# Patient Record
Sex: FEMALE | Race: WHITE | NOT HISPANIC OR LATINO | Employment: FULL TIME | URBAN - METROPOLITAN AREA
[De-identification: names, ages, dates, MRNs, and addresses within clinical notes are randomized per-mention and may not be internally consistent; named-entity substitution may affect disease eponyms.]

---

## 2023-10-17 ENCOUNTER — APPOINTMENT (OUTPATIENT)
Dept: PRIMARY CARE | Facility: CLINIC | Age: 28
End: 2023-10-17
Payer: COMMERCIAL

## 2023-10-31 ENCOUNTER — OFFICE VISIT (OUTPATIENT)
Dept: PRIMARY CARE | Facility: CLINIC | Age: 28
End: 2023-10-31
Payer: COMMERCIAL

## 2023-10-31 VITALS
HEIGHT: 63 IN | TEMPERATURE: 98 F | HEART RATE: 82 BPM | DIASTOLIC BLOOD PRESSURE: 76 MMHG | BODY MASS INDEX: 46.41 KG/M2 | RESPIRATION RATE: 17 BRPM | SYSTOLIC BLOOD PRESSURE: 122 MMHG | WEIGHT: 261.9 LBS | OXYGEN SATURATION: 99 %

## 2023-10-31 DIAGNOSIS — F32.A DEPRESSION, UNSPECIFIED DEPRESSION TYPE: ICD-10-CM

## 2023-10-31 DIAGNOSIS — F41.9 ANXIETY: Primary | ICD-10-CM

## 2023-10-31 PROCEDURE — 99385 PREV VISIT NEW AGE 18-39: CPT | Performed by: NURSE PRACTITIONER

## 2023-10-31 PROCEDURE — 90686 IIV4 VACC NO PRSV 0.5 ML IM: CPT | Performed by: NURSE PRACTITIONER

## 2023-10-31 PROCEDURE — 1036F TOBACCO NON-USER: CPT | Performed by: NURSE PRACTITIONER

## 2023-10-31 PROCEDURE — 90471 IMMUNIZATION ADMIN: CPT | Performed by: NURSE PRACTITIONER

## 2023-10-31 RX ORDER — BUPROPION HYDROCHLORIDE 100 MG/1
100 TABLET, EXTENDED RELEASE ORAL 2 TIMES DAILY
COMMUNITY
Start: 2023-08-29 | End: 2023-10-31 | Stop reason: SDUPTHER

## 2023-10-31 RX ORDER — CITALOPRAM 10 MG/1
10 TABLET ORAL DAILY
Qty: 90 TABLET | Refills: 1 | Status: SHIPPED | OUTPATIENT
Start: 2023-10-31 | End: 2024-03-05 | Stop reason: DRUGHIGH

## 2023-10-31 RX ORDER — BUPROPION HYDROCHLORIDE 100 MG/1
100 TABLET, EXTENDED RELEASE ORAL 2 TIMES DAILY
Qty: 180 TABLET | Refills: 1 | Status: SHIPPED | OUTPATIENT
Start: 2023-10-31 | End: 2024-10-30

## 2023-10-31 RX ORDER — CITALOPRAM 10 MG/1
10 TABLET ORAL DAILY
COMMUNITY
Start: 2023-09-02 | End: 2023-10-31 | Stop reason: SDUPTHER

## 2023-10-31 RX ORDER — ERGOCALCIFEROL 1.25 MG/1
1 CAPSULE ORAL
COMMUNITY
Start: 2022-08-12

## 2023-10-31 RX ORDER — UBIDECARENONE 75 MG
500 CAPSULE ORAL
COMMUNITY
Start: 2022-08-12

## 2023-10-31 ASSESSMENT — PATIENT HEALTH QUESTIONNAIRE - PHQ9
1. LITTLE INTEREST OR PLEASURE IN DOING THINGS: NOT AT ALL
SUM OF ALL RESPONSES TO PHQ9 QUESTIONS 1 AND 2: 0
2. FEELING DOWN, DEPRESSED OR HOPELESS: NOT AT ALL

## 2023-10-31 ASSESSMENT — PAIN SCALES - GENERAL: PAINLEVEL: 0-NO PAIN

## 2023-10-31 NOTE — PATIENT INSTRUCTIONS
Continue medications as prescribed.  Healthy diet and drink plenty of water.  Please schedule all necessary health screenings ophthalmology and dentist.    Follow-up in 6 months sooner if needed  Call office any new concerns.

## 2023-10-31 NOTE — PROGRESS NOTES
"Subjective   Patient ID: Eloisa Mccarthy is a 28 y.o. female who presents for Establish Care (New patient physical. ) and Med Refill.    Med Refill       Patient presents to clinic to establish care.  Previous patient at [facility] last seen by PCP less than a year ago.  Patient states she would like to transfer her care to .    Past medical history of anxiety depression arthritis scoliosis    She is without any specific complaints or concerns today needs medication refills.    Appetite normal bowel and bladder normal.      Review of Systems   All other systems reviewed and are negative.      Objective   /76 (BP Location: Right arm, Patient Position: Sitting, BP Cuff Size: Large adult)   Pulse 82   Temp 36.7 °C (98 °F) (Temporal)   Resp 17   Ht 1.6 m (5' 3\")   Wt 119 kg (261 lb 14.4 oz)   SpO2 99%   BMI 46.39 kg/m²     Physical Exam  Vitals reviewed.   Constitutional:       Appearance: Normal appearance.   HENT:      Right Ear: Tympanic membrane and external ear normal.      Left Ear: Tympanic membrane and external ear normal.      Mouth/Throat:      Mouth: Mucous membranes are moist.      Pharynx: Oropharynx is clear.   Eyes:      Pupils: Pupils are equal, round, and reactive to light.   Cardiovascular:      Rate and Rhythm: Normal rate and regular rhythm.   Pulmonary:      Effort: Pulmonary effort is normal.      Breath sounds: Normal breath sounds.   Abdominal:      General: Bowel sounds are normal.      Palpations: Abdomen is soft.   Musculoskeletal:         General: Normal range of motion.      Cervical back: Normal range of motion and neck supple.   Skin:     General: Skin is warm and dry.   Neurological:      Mental Status: She is alert and oriented to person, place, and time.   Psychiatric:         Mood and Affect: Mood normal.         Behavior: Behavior normal.         Thought Content: Thought content normal.         Judgment: Judgment normal.         Assessment/Plan   Problem List Items " Addressed This Visit    None  Visit Diagnoses       Anxiety    -  Primary    Relevant Medications    buPROPion SR (Wellbutrin SR) 100 mg 12 hr tablet    citalopram (CeleXA) 10 mg tablet    Other Relevant Orders    Referral to Psychiatry    Depression, unspecified depression type        Relevant Medications    buPROPion SR (Wellbutrin SR) 100 mg 12 hr tablet    citalopram (CeleXA) 10 mg tablet    Other Relevant Orders    Referral to Psychiatry

## 2024-03-05 ENCOUNTER — TELEMEDICINE (OUTPATIENT)
Dept: BEHAVIORAL HEALTH | Facility: CLINIC | Age: 29
End: 2024-03-05
Payer: COMMERCIAL

## 2024-03-05 DIAGNOSIS — F41.1 GAD (GENERALIZED ANXIETY DISORDER): ICD-10-CM

## 2024-03-05 PROCEDURE — 99204 OFFICE O/P NEW MOD 45 MIN: CPT | Performed by: PSYCHIATRY & NEUROLOGY

## 2024-03-05 PROCEDURE — 1036F TOBACCO NON-USER: CPT | Performed by: PSYCHIATRY & NEUROLOGY

## 2024-03-05 RX ORDER — CITALOPRAM 20 MG/1
20 TABLET, FILM COATED ORAL DAILY
Qty: 30 TABLET | Refills: 1 | Status: SHIPPED | OUTPATIENT
Start: 2024-03-05 | End: 2024-05-04
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
## 2024-04-02 ENCOUNTER — APPOINTMENT (OUTPATIENT)
Dept: BEHAVIORAL HEALTH | Facility: CLINIC | Age: 29
End: 2024-04-02
Payer: COMMERCIAL

## 2024-04-29 ENCOUNTER — APPOINTMENT (OUTPATIENT)
Dept: PRIMARY CARE | Facility: CLINIC | Age: 29
End: 2024-04-29
Payer: COMMERCIAL

## 2024-06-27 ENCOUNTER — OFFICE VISIT (OUTPATIENT)
Dept: PRIMARY CARE | Facility: CLINIC | Age: 29
End: 2024-06-27
Payer: COMMERCIAL

## 2024-06-27 VITALS
HEART RATE: 100 BPM | HEIGHT: 63 IN | DIASTOLIC BLOOD PRESSURE: 78 MMHG | SYSTOLIC BLOOD PRESSURE: 122 MMHG | TEMPERATURE: 97.7 F | WEIGHT: 278.6 LBS | OXYGEN SATURATION: 98 % | BODY MASS INDEX: 49.36 KG/M2

## 2024-06-27 DIAGNOSIS — R20.2 NUMBNESS AND TINGLING: Primary | ICD-10-CM

## 2024-06-27 DIAGNOSIS — R20.0 NUMBNESS AND TINGLING: Primary | ICD-10-CM

## 2024-06-27 PROCEDURE — 99213 OFFICE O/P EST LOW 20 MIN: CPT | Performed by: NURSE PRACTITIONER

## 2024-06-27 RX ORDER — IBUPROFEN 600 MG/1
600 TABLET ORAL 3 TIMES DAILY PRN
Qty: 30 TABLET | Refills: 0 | Status: SHIPPED | OUTPATIENT
Start: 2024-06-27 | End: 2024-07-07

## 2024-06-27 RX ORDER — LEVONORGESTREL 52 MG/1
INTRAUTERINE DEVICE INTRAUTERINE
COMMUNITY
Start: 2024-05-17

## 2024-06-27 ASSESSMENT — PAIN SCALES - GENERAL: PAINLEVEL: 0-NO PAIN

## 2024-06-27 NOTE — PATIENT INSTRUCTIONS
A prescription was sent to your pharmacy. Your pharmacist can answer any questions or concerns you may have or you may call the office.    Return for Annual with [facility]  Nothing to eat or drink for 10 hours. Black coffee, black tea or water is ok    Health Tips for People in their 20's     Develop good health habits now  Don't put it off. With good health habits, you can prevent or reduce the likelihood of health-impacting conditions later in life, such as obesity, high blood pressure, heart disease, or diabetes. Establishing good health habits now is easier than having to begin these practices later on, or to have to break bad habits.      Establish a relationship with a primary care provider   A PCP can help you on your health journey. When you see a provider on a regular basis, you're more likely to feel comfortable about talking about sensitive issues as well as being receptive to the advice your provider offers, because you develop a trusting relationship. And by getting to know you over time, your doctor may be better able to  on signs of health concerns.      Know your family health history   Knowing your family's health history can help you and your primary care provider better manage your health - and be aware of potential hereditary risks to be watching for.  Things like migraines or even family members with certain cancers and heart attack can increase your risk.       Get regular health screenings and Keep up with immunizations. This includes the HPV vaccine, for men and women.   For women: Monthly Self breast exams, See Gynecology for a Pap smear; HPV screening for the virus that causes genital warts, which can lead to cervical cancer   For men: Monthly Self testicular exams.   For both: sexually transmitted disease testing, if sexually active. Remember to use birth control to prevent and to protect. Talk with your health care provider about the screening schedule that's best for  you.    Have good for you people in your life  Its all about a few good friends over a lot of not so good friends. If you are close to your family stay that way, if not then develop new relationships that help you to grow and thrive. Often people make friends at work, Yazidism, community groups  Developing and maintaining a work-life balance that allows room for friendships and relationships can have a positive impact on your mental and emotional health.     Be a numbers person  Keep tabs on numbers that affect your health, like weight, blood pressure, the amount of calories you consume, and cholesterol. Your health care provider can help you with this.      Pay attention to the risk of a few extra pounds.  If you gain four or five pounds every year, it doesn't seem like a lot, but at the end of 10 years, you've added 40 or 50 pounds - and in 15 to 20 years, you have 75 to 100 extra pounds that you're carrying!  *Choose a life of healthful eating over trendy diets. The more effective approach: adopt a life-time practice of eating a balanced, nutritional diet that includes vegetables, fruits, lean meats, whole grains, low-fat dairy, nuts and legumes, and non-tropical vegetable oils. And limit sweets.   *Practice portion control. There's more to healthy eating than choosing nutritious food. There's also limiting how much you eat, even if you're eating incredibly healthy food *Remember, your metabolic rate slows as you age. That is, your body becomes less efficient in burning calories.     Stay active   If you're exercising on a regular basis, that is going to help with a lot of health problems that are related to lifestyle.  You don't have to be an athlete, start with a walking program. Even 10 minutes of good exercise is beneficial. Don't forget weight training. Any Weights 3 days a week maintains bone health and helps to burn calories    Get enough sleep  For most that means seven to nine hours a night.   Sleep  affects your ability to learn new information and to memorize and process information. Reaction time is adversely affected by too little sleep (a big safety risk similar to drinking alcohol). In the long run, sleep makes a big difference in how you function and succeed     Mood impacts your overall health  People who are struggling with depression, anxiety, and self-esteem issues really have a lot of difficulty with their health. When depressed, you may not be motivated and may not see the value of taking care of your health. Self Care, Exercise and friendships can help reduce your risk of mental and emotional health issues, and when you need it, your health care provider can help you get professional help.      Don't vape  Vaping is a big problem ,it's not just flavored water, nicotine comes from tobacco so you are in essence still smoking. Also, we don't know about the effects of what's in vaping cartridges, and sometimes they're modified with substances that can cause lung failure. Cigarettes are even worse with known cancer causing chemicals  2 ml of vape juice is equal to 1 pack of cigarettes    Think twice about marijuana  Even in states where marijuana is legal (medically or recreationally) it doesn't mean your employer is going to think it's OK.   Like alcohol, marijuana affects your reasoning, decision-making, and ability to operate equipment safely

## 2024-06-27 NOTE — PROGRESS NOTES
"Subjective   Patient ID: Eloisa Mccarthy is a 29 y.o. female who presents for new patient sick visit (Tingling in arms, middle of back, and hands; started one week ago) and Hearing Screening (Would like to get hearing tested ).    HPI   This is a pleasant 28 y/o female patient of Dr Blood at [facility] who presents with c/o  tingling to bilateral hands. First noted 1 week ago with associated pinching to the center of her back, between shoulder blades. Reports started a new job a few weeks ago and her chair pushes at the area on her back that pinches. Has been trying other chairs at work but hasn't found a more comfortable option. Denies trauma or injury, no loss of strength, intermittent tingle, does not know what makes better, worse with computer chair at work. Sits long days, does not stretch    Hearing loss-reports tv hard to hear, trouble hearing people speaking to her, family has noticed. Has family members with hearing loss at young age and she would like to be evaluated      Review of Systems  Review of Systems   Constitutional: Negative.    HENT: HPI     Respiratory: Negative.     Cardiovascular: Negative.    Gastrointestinal: Negative.    Genitourinary: Negative.    Musculoskeletal: HPI   Psychiatric/Behavioral: Negative.     All other systems reviewed and are negative.    Objective   /78 (BP Location: Left arm, Patient Position: Sitting)   Pulse 100   Temp 36.5 °C (97.7 °F)   Ht 1.6 m (5' 3\")   Wt 126 kg (278 lb 9.6 oz)   SpO2 98%   BMI 49.35 kg/m²     Physical Exam  Vitals reviewed.   Constitutional:       Appearance: Normal appearance.   HENT:      Head: Normocephalic and atraumatic.      Right Ear: Tympanic membrane and ear canal normal.      Left Ear: Tympanic membrane and ear canal normal.      Nose: Nose normal.      Mouth/Throat:      Pharynx: Oropharynx is clear.   Cardiovascular:      Rate and Rhythm: Normal rate and regular rhythm.   Pulmonary:      Effort: Pulmonary effort is normal.      " Breath sounds: Normal breath sounds.   Musculoskeletal:         General: No swelling, tenderness or deformity. Normal range of motion.      Right shoulder: Normal.      Left shoulder: Normal.      Right upper arm: Normal.      Left upper arm: Normal.      Right elbow: Normal.      Left elbow: Normal.      Right hand: Normal.      Left hand: Normal.      Cervical back: Normal.      Thoracic back: Normal.   Neurological:      Mental Status: She is alert.         Assessment/Plan   Problem List Items Addressed This Visit             ICD-10-CM    Numbness and tingling - Primary R20.0, R20.2     Find better chair, supportive  Daily stretches, Handouts provided and reviewed with patient, discussed           Relevant Medications    ibuprofen 600 mg tablet

## 2024-06-28 NOTE — ASSESSMENT & PLAN NOTE
Find better chair, supportive  Daily stretches, Handouts provided and reviewed with patient, discussed

## 2024-07-02 ENCOUNTER — APPOINTMENT (OUTPATIENT)
Dept: BEHAVIORAL HEALTH | Facility: CLINIC | Age: 29
End: 2024-07-02
Payer: COMMERCIAL

## 2024-07-02 DIAGNOSIS — F41.1 GAD (GENERALIZED ANXIETY DISORDER): ICD-10-CM

## 2024-07-02 DIAGNOSIS — F32.A DEPRESSION, UNSPECIFIED DEPRESSION TYPE: ICD-10-CM

## 2024-07-02 DIAGNOSIS — F41.9 ANXIETY: ICD-10-CM

## 2024-07-02 PROCEDURE — 99214 OFFICE O/P EST MOD 30 MIN: CPT | Performed by: PSYCHIATRY & NEUROLOGY
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
## 2024-07-03 ENCOUNTER — LAB (OUTPATIENT)
Dept: LAB | Facility: LAB | Age: 29
End: 2024-07-03
Payer: COMMERCIAL

## 2024-07-03 DIAGNOSIS — F41.1 GAD (GENERALIZED ANXIETY DISORDER): ICD-10-CM

## 2024-07-03 LAB
AMPHETAMINES UR QL SCN: NORMAL
BARBITURATES UR QL SCN: NORMAL
BENZODIAZ UR QL SCN: NORMAL
BZE UR QL SCN: NORMAL
CANNABINOIDS UR QL SCN: NORMAL
FENTANYL+NORFENTANYL UR QL SCN: NORMAL
METHADONE UR QL SCN: NORMAL
OPIATES UR QL SCN: NORMAL
OXYCODONE+OXYMORPHONE UR QL SCN: NORMAL
PCP UR QL SCN: NORMAL

## 2024-07-03 PROCEDURE — 80307 DRUG TEST PRSMV CHEM ANLYZR: CPT

## 2024-07-03 RX ORDER — CITALOPRAM 20 MG/1
20 TABLET, FILM COATED ORAL DAILY
Qty: 90 TABLET | Refills: 0 | Status: SHIPPED | OUTPATIENT
Start: 2024-07-03 | End: 2024-10-01

## 2024-07-03 RX ORDER — CLONAZEPAM 0.5 MG/1
0.5 TABLET ORAL DAILY PRN
Qty: 10 TABLET | Refills: 0 | Status: SHIPPED | OUTPATIENT
Start: 2024-07-03 | End: 2024-07-13

## 2024-07-03 RX ORDER — BUPROPION HYDROCHLORIDE 100 MG/1
100 TABLET, EXTENDED RELEASE ORAL 2 TIMES DAILY
Qty: 180 TABLET | Refills: 0 | Status: SHIPPED | OUTPATIENT
Start: 2024-07-03 | End: 2024-10-01

## 2024-07-12 DIAGNOSIS — Z00.00 ANNUAL PHYSICAL EXAM: Primary | ICD-10-CM

## 2024-07-16 ENCOUNTER — LAB (OUTPATIENT)
Dept: LAB | Facility: LAB | Age: 29
End: 2024-07-16
Payer: COMMERCIAL

## 2024-07-16 DIAGNOSIS — D50.8 IRON DEFICIENCY ANEMIA SECONDARY TO INADEQUATE DIETARY IRON INTAKE: ICD-10-CM

## 2024-07-16 DIAGNOSIS — E53.8 COBALAMIN DEFICIENCY: ICD-10-CM

## 2024-07-16 DIAGNOSIS — Z00.00 ANNUAL PHYSICAL EXAM: ICD-10-CM

## 2024-07-16 LAB
25(OH)D3 SERPL-MCNC: 33 NG/ML (ref 30–100)
ALBUMIN SERPL BCP-MCNC: 4.4 G/DL (ref 3.4–5)
ALP SERPL-CCNC: 53 U/L (ref 33–110)
ALT SERPL W P-5'-P-CCNC: 24 U/L (ref 7–45)
ANION GAP SERPL CALC-SCNC: 13 MMOL/L (ref 10–20)
AST SERPL W P-5'-P-CCNC: 16 U/L (ref 9–39)
BILIRUB SERPL-MCNC: 0.5 MG/DL (ref 0–1.2)
BUN SERPL-MCNC: 9 MG/DL (ref 6–23)
CALCIUM SERPL-MCNC: 9.2 MG/DL (ref 8.6–10.6)
CHLORIDE SERPL-SCNC: 105 MMOL/L (ref 98–107)
CHOLEST SERPL-MCNC: 141 MG/DL (ref 0–199)
CHOLESTEROL/HDL RATIO: 3.8
CO2 SERPL-SCNC: 27 MMOL/L (ref 21–32)
CREAT SERPL-MCNC: 0.75 MG/DL (ref 0.5–1.05)
EGFRCR SERPLBLD CKD-EPI 2021: >90 ML/MIN/1.73M*2
ERYTHROCYTE [DISTWIDTH] IN BLOOD BY AUTOMATED COUNT: 13.3 % (ref 11.5–14.5)
EST. AVERAGE GLUCOSE BLD GHB EST-MCNC: 103 MG/DL
GLUCOSE SERPL-MCNC: 89 MG/DL (ref 74–99)
HBA1C MFR BLD: 5.2 %
HCT VFR BLD AUTO: 40.3 % (ref 36–46)
HDLC SERPL-MCNC: 37.2 MG/DL
HGB BLD-MCNC: 13 G/DL (ref 12–16)
LDLC SERPL CALC-MCNC: 82 MG/DL
MCH RBC QN AUTO: 27.8 PG (ref 26–34)
MCHC RBC AUTO-ENTMCNC: 32.3 G/DL (ref 32–36)
MCV RBC AUTO: 86 FL (ref 80–100)
NON HDL CHOLESTEROL: 104 MG/DL (ref 0–149)
NRBC BLD-RTO: 0 /100 WBCS (ref 0–0)
PLATELET # BLD AUTO: 347 X10*3/UL (ref 150–450)
POTASSIUM SERPL-SCNC: 4.1 MMOL/L (ref 3.5–5.3)
PROT SERPL-MCNC: 7.4 G/DL (ref 6.4–8.2)
RBC # BLD AUTO: 4.68 X10*6/UL (ref 4–5.2)
SODIUM SERPL-SCNC: 141 MMOL/L (ref 136–145)
TRIGL SERPL-MCNC: 107 MG/DL (ref 0–149)
TSH SERPL-ACNC: 2.71 MIU/L (ref 0.44–3.98)
VLDL: 21 MG/DL (ref 0–40)
WBC # BLD AUTO: 10.4 X10*3/UL (ref 4.4–11.3)

## 2024-07-16 PROCEDURE — 82607 VITAMIN B-12: CPT

## 2024-07-16 PROCEDURE — 80053 COMPREHEN METABOLIC PANEL: CPT

## 2024-07-16 PROCEDURE — 83036 HEMOGLOBIN GLYCOSYLATED A1C: CPT

## 2024-07-16 PROCEDURE — 83540 ASSAY OF IRON: CPT

## 2024-07-16 PROCEDURE — 84443 ASSAY THYROID STIM HORMONE: CPT

## 2024-07-16 PROCEDURE — 82306 VITAMIN D 25 HYDROXY: CPT

## 2024-07-16 PROCEDURE — 36415 COLL VENOUS BLD VENIPUNCTURE: CPT

## 2024-07-16 PROCEDURE — 85027 COMPLETE CBC AUTOMATED: CPT

## 2024-07-16 PROCEDURE — 80061 LIPID PANEL: CPT

## 2024-07-18 ENCOUNTER — APPOINTMENT (OUTPATIENT)
Dept: PRIMARY CARE | Facility: CLINIC | Age: 29
End: 2024-07-18
Payer: COMMERCIAL

## 2024-07-18 VITALS
OXYGEN SATURATION: 97 % | TEMPERATURE: 97.5 F | BODY MASS INDEX: 49.15 KG/M2 | HEIGHT: 63 IN | SYSTOLIC BLOOD PRESSURE: 122 MMHG | WEIGHT: 277.4 LBS | HEART RATE: 83 BPM | DIASTOLIC BLOOD PRESSURE: 70 MMHG

## 2024-07-18 DIAGNOSIS — F41.1 GAD (GENERALIZED ANXIETY DISORDER): ICD-10-CM

## 2024-07-18 DIAGNOSIS — H90.6 MIXED CONDUCTIVE AND SENSORINEURAL HEARING LOSS OF BOTH EARS: ICD-10-CM

## 2024-07-18 DIAGNOSIS — E53.8 COBALAMIN DEFICIENCY: ICD-10-CM

## 2024-07-18 DIAGNOSIS — Z00.00 ANNUAL PHYSICAL EXAM: Primary | ICD-10-CM

## 2024-07-18 DIAGNOSIS — F33.1 MODERATE EPISODE OF RECURRENT MAJOR DEPRESSIVE DISORDER (MULTI): ICD-10-CM

## 2024-07-18 DIAGNOSIS — D50.8 IRON DEFICIENCY ANEMIA SECONDARY TO INADEQUATE DIETARY IRON INTAKE: ICD-10-CM

## 2024-07-18 PROBLEM — D50.9 IRON DEFICIENCY ANEMIA: Status: ACTIVE | Noted: 2019-02-08

## 2024-07-18 PROBLEM — G43.909 MIGRAINE WITHOUT STATUS MIGRAINOSUS, NOT INTRACTABLE: Status: ACTIVE | Noted: 2019-02-06

## 2024-07-18 PROBLEM — F33.9 EPISODE OF RECURRENT MAJOR DEPRESSIVE DISORDER (CMS-HCC): Status: ACTIVE | Noted: 2019-02-06

## 2024-07-18 LAB
ALBUMIN SERPL BCP-MCNC: 4.5 G/DL (ref 3.4–5)
ALP SERPL-CCNC: 52 U/L (ref 33–110)
ALT SERPL W P-5'-P-CCNC: 23 U/L (ref 7–45)
ANION GAP SERPL CALC-SCNC: 15 MMOL/L (ref 10–20)
AST SERPL W P-5'-P-CCNC: 18 U/L (ref 9–39)
BILIRUB SERPL-MCNC: 0.5 MG/DL (ref 0–1.2)
BUN SERPL-MCNC: 9 MG/DL (ref 6–23)
CALCIUM SERPL-MCNC: 9.2 MG/DL (ref 8.6–10.6)
CHLORIDE SERPL-SCNC: 105 MMOL/L (ref 98–107)
CO2 SERPL-SCNC: 25 MMOL/L (ref 21–32)
CREAT SERPL-MCNC: 0.65 MG/DL (ref 0.5–1.05)
EGFRCR SERPLBLD CKD-EPI 2021: >90 ML/MIN/1.73M*2
GLUCOSE SERPL-MCNC: 93 MG/DL (ref 74–99)
IRON SERPL-MCNC: 70 UG/DL (ref 35–150)
POTASSIUM SERPL-SCNC: 4.1 MMOL/L (ref 3.5–5.3)
PROT SERPL-MCNC: 7.2 G/DL (ref 6.4–8.2)
SODIUM SERPL-SCNC: 141 MMOL/L (ref 136–145)
TSH SERPL-ACNC: 2.85 MIU/L (ref 0.44–3.98)
VIT B12 SERPL-MCNC: 753 PG/ML (ref 211–911)

## 2024-07-18 PROCEDURE — 3008F BODY MASS INDEX DOCD: CPT | Performed by: NURSE PRACTITIONER

## 2024-07-18 PROCEDURE — 99395 PREV VISIT EST AGE 18-39: CPT | Performed by: NURSE PRACTITIONER

## 2024-07-18 RX ORDER — FERROUS SULFATE 325(65) MG
325 TABLET ORAL
COMMUNITY

## 2024-07-18 ASSESSMENT — PATIENT HEALTH QUESTIONNAIRE - PHQ9
SUM OF ALL RESPONSES TO PHQ9 QUESTIONS 1 AND 2: 0
2. FEELING DOWN, DEPRESSED OR HOPELESS: NOT AT ALL
1. LITTLE INTEREST OR PLEASURE IN DOING THINGS: NOT AT ALL

## 2024-07-18 ASSESSMENT — ENCOUNTER SYMPTOMS
CONSTITUTIONAL NEGATIVE: 1
PSYCHIATRIC NEGATIVE: 1
NEUROLOGICAL NEGATIVE: 1
HEMATOLOGIC/LYMPHATIC NEGATIVE: 1
ENDOCRINE NEGATIVE: 1
CARDIOVASCULAR NEGATIVE: 1
GASTROINTESTINAL NEGATIVE: 1
EYES NEGATIVE: 1
DEPRESSION: 0
ALLERGIC/IMMUNOLOGIC NEGATIVE: 1
MUSCULOSKELETAL NEGATIVE: 1
RESPIRATORY NEGATIVE: 1

## 2024-07-18 ASSESSMENT — PAIN SCALES - GENERAL: PAINLEVEL: 0-NO PAIN

## 2024-07-18 NOTE — PROGRESS NOTES
"Subjective   Patient ID: Eloisa Mccarthy is a 29 y.o. female who presents for Annual Exam (Pt is not fasting ).    HPI   Pleasant 28 y/o female with a PMH of Hearing loss, depression, anxiety, migraine without aura, cobalamin deficiency, and iron deficiency who presents for an annual exam.     Current concerns   Hearing loss, bilateral- Reports noted as a child. Lately has noticed more difficulty hearing high pitched sounds, uses subtitles on the tv, harder to hear patients at work. Denies tinnitus, trauma or injury, notes her sister is deaf.  Audiology referral placed.     BMI 49 - Interested in weight loss. Thought to be hormonal so she had IUD removal, noted to be displaced. Poor diet, trouble eating healthy 2/2 work schedule. Education provided on plate method, fruits and veggies, increase activity. Agrees to nutritionist referral.     At last visit had c/o Tingling in bilateral hands, arms, middle of back  thought to be due to chair at work. States resolved.    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  Mom biological - SLE, DM  Older Sister biological - deaf, SLE  Sister biological - Crohn's      Review of Systems   Constitutional: Negative.    HENT: Negative.     Eyes: Negative.    Respiratory: Negative.     Cardiovascular: Negative.    Gastrointestinal: Negative.    Endocrine: Negative.    Genitourinary: Negative.    Musculoskeletal: Negative.    Skin: Negative.    Allergic/Immunologic: Negative.    Neurological: Negative.    Hematological: Negative.    Psychiatric/Behavioral: Negative.     All other systems reviewed and are negative.      Objective   /70 (BP Location: Left arm, Patient Position: " "Sitting)   Pulse 83   Temp 36.4 °C (97.5 °F)   Ht 1.6 m (5' 3\")   Wt 126 kg (277 lb 6.4 oz)   LMP  (LMP Unknown)   SpO2 97%   BMI 49.14 kg/m²     Physical Exam  Vitals reviewed.   Constitutional:       Appearance: Normal appearance. She is obese.   HENT:      Head: Normocephalic and atraumatic.      Right Ear: Tympanic membrane, ear canal and external ear normal. Decreased hearing noted.      Left Ear: Tympanic membrane, ear canal and external ear normal. Decreased hearing noted.      Nose: Nose normal.      Mouth/Throat:      Mouth: Mucous membranes are moist.      Pharynx: Oropharynx is clear.   Eyes:      Extraocular Movements: Extraocular movements intact.      Conjunctiva/sclera: Conjunctivae normal.      Pupils: Pupils are equal, round, and reactive to light.   Cardiovascular:      Rate and Rhythm: Normal rate and regular rhythm.      Pulses: Normal pulses.      Heart sounds: Normal heart sounds.   Pulmonary:      Effort: Pulmonary effort is normal.      Breath sounds: Normal breath sounds.   Abdominal:      General: Bowel sounds are normal.      Palpations: Abdomen is soft.   Musculoskeletal:         General: Normal range of motion.      Cervical back: Normal range of motion and neck supple.   Skin:     General: Skin is warm.      Capillary Refill: Capillary refill takes less than 2 seconds.   Neurological:      General: No focal deficit present.      Mental Status: She is alert and oriented to person, place, and time. Mental status is at baseline.   Psychiatric:         Mood and Affect: Mood normal.         Behavior: Behavior normal.         Thought Content: Thought content normal.         Judgment: Judgment normal.         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          Annual physical exam - Primary Z00.00    Relevant Orders    Referral to Audiology    Comprehensive Metabolic Panel (Completed)    TSH with reflex to Free T4 if abnormal (Completed)    Referral to Nutrition Services    BMI 45.0-49.9, adult (Multi) Z68.42    Relevant Orders    Referral to Nutrition Services     I was present with the APRN student who participated in the documentation of this note. I have personally seen and re-examined the patient and performed the medical decision-making components (assessment and plan of care). I have reviewed the APRN student documentation and verified the findings in the note as written with additions or exceptions as stated in the body of this note  Josette Marti [facility]-BC

## 2024-07-18 NOTE — PATIENT INSTRUCTIONS
Labs will be released to My Chart or if you are not connected you will be notified of abnormals only    Health Tips for People in their 20's     Develop good health habits now  Don't put it off. With good health habits, you can prevent or reduce the likelihood of health-impacting conditions later in life, such as obesity, high blood pressure, heart disease, or diabetes. Establishing good health habits now is easier than having to begin these practices later on, or to have to break bad habits.      Establish a relationship with a primary care provider   A PCP can help you on your health journey. When you see a provider on a regular basis, you're more likely to feel comfortable about talking about sensitive issues as well as being receptive to the advice your provider offers, because you develop a trusting relationship. And by getting to know you over time, your doctor may be better able to  on signs of health concerns.      Know your family health history   Knowing your family's health history can help you and your primary care provider better manage your health - and be aware of potential hereditary risks to be watching for.  Things like migraines or even family members with certain cancers and heart attack can increase your risk.       Get regular health screenings and Keep up with immunizations. This includes the HPV vaccine, for men and women.   For women: Monthly Self breast exams, See Gynecology for a Pap smear; HPV screening for the virus that causes genital warts, which can lead to cervical cancer   For men: Monthly Self testicular exams.   For both: sexually transmitted disease testing, if sexually active. Remember to use birth control to prevent and to protect. Talk with your health care provider about the screening schedule that's best for you.    Have good for you people in your life  Its all about a few good friends over a lot of not so good friends. If you are close to your family stay that way,  if not then develop new relationships that help you to grow and thrive. Often people make friends at work, Jainism, community groups  Developing and maintaining a work-life balance that allows room for friendships and relationships can have a positive impact on your mental and emotional health.     Be a numbers person  Keep tabs on numbers that affect your health, like weight, blood pressure, the amount of calories you consume, and cholesterol. Your health care provider can help you with this.      Pay attention to the risk of a few extra pounds.  If you gain four or five pounds every year, it doesn't seem like a lot, but at the end of 10 years, you've added 40 or 50 pounds - and in 15 to 20 years, you have 75 to 100 extra pounds that you're carrying!  *Choose a life of healthful eating over trendy diets. The more effective approach: adopt a life-time practice of eating a balanced, nutritional diet that includes vegetables, fruits, lean meats, whole grains, low-fat dairy, nuts and legumes, and non-tropical vegetable oils. And limit sweets.   *Practice portion control. There's more to healthy eating than choosing nutritious food. There's also limiting how much you eat, even if you're eating incredibly healthy food *Remember, your metabolic rate slows as you age. That is, your body becomes less efficient in burning calories.     Stay active   If you're exercising on a regular basis, that is going to help with a lot of health problems that are related to lifestyle.  You don't have to be an athlete, start with a walking program. Even 10 minutes of good exercise is beneficial. Don't forget weight training. Any Weights 3 days a week maintains bone health and helps to burn calories    Get enough sleep  For most that means seven to nine hours a night.   Sleep affects your ability to learn new information and to memorize and process information. Reaction time is adversely affected by too little sleep (a big safety risk  similar to drinking alcohol). In the long run, sleep makes a big difference in how you function and succeed     Mood impacts your overall health  People who are struggling with depression, anxiety, and self-esteem issues really have a lot of difficulty with their health. When depressed, you may not be motivated and may not see the value of taking care of your health. Self Care, Exercise and friendships can help reduce your risk of mental and emotional health issues, and when you need it, your health care provider can help you get professional help.      Don't vape  Vaping is a big problem ,it's not just flavored water, nicotine comes from tobacco so you are in essence still smoking. Also, we don't know about the effects of what's in vaping cartridges, and sometimes they're modified with substances that can cause lung failure. Cigarettes are even worse with known cancer causing chemicals  2 ml of vape juice is equal to 1 pack of cigarettes    Think twice about marijuana  Even in states where marijuana is legal (medically or recreationally) it doesn't mean your employer is going to think it's OK.   Like alcohol, marijuana affects your reasoning, decision-making, and ability to operate equipment safely

## 2024-07-30 ENCOUNTER — CLINICAL SUPPORT (OUTPATIENT)
Dept: AUDIOLOGY | Facility: CLINIC | Age: 29
End: 2024-07-30
Payer: COMMERCIAL

## 2024-07-30 DIAGNOSIS — Z00.00 ANNUAL PHYSICAL EXAM: ICD-10-CM

## 2024-07-30 DIAGNOSIS — H90.3 SNHL (SENSORY-NEURAL HEARING LOSS), ASYMMETRICAL: Primary | ICD-10-CM

## 2024-07-30 DIAGNOSIS — H92.01 OTALGIA, RIGHT: ICD-10-CM

## 2024-07-30 PROCEDURE — 92557 COMPREHENSIVE HEARING TEST: CPT | Performed by: AUDIOLOGIST

## 2024-07-30 PROCEDURE — 92550 TYMPANOMETRY & REFLEX THRESH: CPT | Performed by: AUDIOLOGIST

## 2024-07-30 ASSESSMENT — PAIN - FUNCTIONAL ASSESSMENT: PAIN_FUNCTIONAL_ASSESSMENT: 0-10

## 2024-07-30 ASSESSMENT — PAIN SCALES - GENERAL: PAINLEVEL_OUTOF10: 2

## 2024-07-30 NOTE — PROGRESS NOTES
"AUDIOLOGY ADULT AUDIOMETRIC EVALUATION      Name:  Eloisa Mccarthy  :  1995  Age:  29 y.o.  Date of Evaluation:  2024    HISTORY  Reason for visit:  hearing loss  Ms. Mccarthy is seen 2024 at the request of  Josette Marti, *APRN-CNP for an evaluation of hearing.      Chief complaint:    - high frequency hearing loss; identified around elementary school  - has worsened since the last time it was tested (grade school)    Hearing loss:   hearing loss since childhood;  right ear seems worse than left  Tinnitus:   denies  Otitis Media: right ear enflamed at PCP visit 2024  Otologic surgical history:  denies   Dizziness/imbalance:  imbalance; dizziness/\"like you are on a boat\" with headache and neausea  Otalgia:  right ear pain; 2-3/10 today  Ear pressure/fullness:  denies  History of excessive noise exposure:  denies  Other:  sister is deaf    Hearing aid history: none          EVALUATION  Please find audiogram in \"Media\" tab (Document Type:  Audiology Report) or included at the bottom of this note.    RESULTS   Otoscopic Evaluation: minimal cerumen bilaterally      Immittance Measures (226 Hz probe tone):   Tympanometry is consistent with normal middle ear pressure and normal tympanic membrane mobility bilaterally.       Ipsilateral acoustic reflexes were present for 500-4000 Hz bilaterally.       Test technique:  standard behavioral technique via insert earphones.  Reliability is good.    Pure Tone Audiometry:  Hearing sensitivity is in the normal hearing to severe hearing loss range bilaterally.      Note asymmetry for 1500 Hz (left worse than right)      Speech Audiometry:        Right Ear:  Speech Reception Threshold (SRT) was obtained at 30 dBHL                 Speech discrimination score was 92% in quiet when words were presented at 80 dBHL      Left Ear:  Speech Reception Threshold (SRT) was obtained at 25 dBHL                 Speech discrimination score was 100% in quiet when words were " presented at 75 dBHL    IMPRESSIONS:  Patient is expected to have communication difficulty in adverse listening environments.    Patient is expected to benefit from devices that provide amplification (e.g., hearing aids) and improve the desired sound signal over that of background noise.       RECOMMENDATIONS  Consultation with an ear, nose, and throat (ENT) specialist regarding asymmetry and dizziness.    Hearing Aid Evaluation with an audiologist to discuss hearing technology (such as hearing aids) and services.  Patient was encouraged to check into insurance benefit for hearing aids and identify contracted providers.  If patient does not have (or does not wish to use) a hearing aid benefit, she may return to [facility] for hearing aid devices and services.   Reassess hearing in 1 year (or sooner if medically indicated or if there is a concern for a change in hearing).    Continue with medical follow-up as indicated.       PATIENT EDUCATION  Discussed results and recommendations with patient.  Questions were addressed and the patient was encouraged to contact our department should concerns arise.       ITA Hand, CCC-A  Licensed Audiologist

## 2024-08-08 ENCOUNTER — APPOINTMENT (OUTPATIENT)
Dept: PRIMARY CARE | Facility: CLINIC | Age: 29
End: 2024-08-08
Payer: COMMERCIAL

## 2024-09-16 ENCOUNTER — DOCUMENTATION (OUTPATIENT)
Dept: PRIMARY CARE | Facility: CLINIC | Age: 29
End: 2024-09-16
Payer: COMMERCIAL

## 2024-10-06 DIAGNOSIS — F41.1 GAD (GENERALIZED ANXIETY DISORDER): ICD-10-CM

## 2024-10-07 RX ORDER — CITALOPRAM 20 MG/1
20 TABLET, FILM COATED ORAL DAILY
Qty: 90 TABLET | Refills: 0 | Status: SHIPPED | OUTPATIENT
Start: 2024-10-07

## 2024-10-22 ENCOUNTER — APPOINTMENT (OUTPATIENT)
Dept: PRIMARY CARE | Facility: CLINIC | Age: 29
End: 2024-10-22
Payer: COMMERCIAL

## 2024-10-24 ENCOUNTER — APPOINTMENT (OUTPATIENT)
Dept: PRIMARY CARE | Facility: CLINIC | Age: 29
End: 2024-10-24
Payer: COMMERCIAL

## 2024-10-24 ENCOUNTER — OFFICE VISIT (OUTPATIENT)
Dept: PRIMARY CARE | Facility: CLINIC | Age: 29
End: 2024-10-24
Payer: COMMERCIAL

## 2024-10-24 VITALS
DIASTOLIC BLOOD PRESSURE: 66 MMHG | TEMPERATURE: 97.3 F | BODY MASS INDEX: 49.14 KG/M2 | OXYGEN SATURATION: 98 % | SYSTOLIC BLOOD PRESSURE: 112 MMHG | HEART RATE: 98 BPM | WEIGHT: 277.4 LBS

## 2024-10-24 DIAGNOSIS — H90.6 MIXED CONDUCTIVE AND SENSORINEURAL HEARING LOSS OF BOTH EARS: Primary | ICD-10-CM

## 2024-10-24 DIAGNOSIS — G43.829 MENSTRUAL MIGRAINE WITHOUT STATUS MIGRAINOSUS, NOT INTRACTABLE: ICD-10-CM

## 2024-10-24 DIAGNOSIS — L21.9 SEBORRHEIC DERMATITIS OF SCALP: ICD-10-CM

## 2024-10-24 DIAGNOSIS — F41.1 GAD (GENERALIZED ANXIETY DISORDER): ICD-10-CM

## 2024-10-24 PROCEDURE — 99214 OFFICE O/P EST MOD 30 MIN: CPT | Performed by: NURSE PRACTITIONER

## 2024-10-24 RX ORDER — CITALOPRAM 20 MG/1
20 TABLET, FILM COATED ORAL DAILY
Qty: 90 TABLET | Refills: 2 | Status: SHIPPED | OUTPATIENT
Start: 2024-10-24

## 2024-10-24 RX ORDER — SUMATRIPTAN 50 MG/1
50 TABLET, FILM COATED ORAL ONCE AS NEEDED
Qty: 9 TABLET | Refills: 5 | Status: SHIPPED | OUTPATIENT
Start: 2024-10-24 | End: 2025-10-24

## 2024-10-24 ASSESSMENT — ENCOUNTER SYMPTOMS: DEPRESSION: 0

## 2024-10-24 ASSESSMENT — PAIN SCALES - GENERAL: PAINLEVEL_OUTOF10: 0-NO PAIN

## 2024-10-24 NOTE — PATIENT INSTRUCTIONS
A prescription was sent to your pharmacy. Your pharmacist can answer any questions or concerns you may have or you may call the office.    Head and Shoulders    The best way to lose weight is to use up more calories than you consume each day. The first step is determining how many calories you need each day and how many calories you are actually consuming. [employer] and other free apps can help you to track your calories to see what you are eating and what high calorie foods you should be limiting. When decreasing high calorie foods you should also increase low calorie foods and water so that you stay full. Good sources include non starchy vegetables like lettuce and cucumber, high water fruits like watermelon. Avoid animal products or eat low fat versions of milk, cheeses, yogurt.  Daily exercise will help to burn calories as well which will speed up weight loss when following a low calorie diet. Walking, biking, swimming are all good exercises. Aim for 15-30 minutes each day. Adding weights 3 x a week help to build or maintain muscle and protect bone health. An added bonus is you continue to burn calories after you finish your weight workout!
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
## 2024-10-25 PROBLEM — L21.9 SEBORRHEIC DERMATITIS OF SCALP: Status: ACTIVE | Noted: 2024-10-25

## 2024-10-28 ENCOUNTER — OFFICE VISIT (OUTPATIENT)
Dept: PRIMARY CARE | Facility: CLINIC | Age: 29
End: 2024-10-28
Payer: COMMERCIAL

## 2024-10-28 VITALS
SYSTOLIC BLOOD PRESSURE: 140 MMHG | OXYGEN SATURATION: 100 % | WEIGHT: 280 LBS | DIASTOLIC BLOOD PRESSURE: 88 MMHG | BODY MASS INDEX: 49.6 KG/M2 | TEMPERATURE: 97.7 F | HEART RATE: 75 BPM

## 2024-10-28 DIAGNOSIS — G43.829 MENSTRUAL MIGRAINE WITHOUT STATUS MIGRAINOSUS, NOT INTRACTABLE: Primary | ICD-10-CM

## 2024-10-28 PROCEDURE — 99214 OFFICE O/P EST MOD 30 MIN: CPT | Performed by: NURSE PRACTITIONER

## 2024-10-28 PROCEDURE — 1036F TOBACCO NON-USER: CPT | Performed by: NURSE PRACTITIONER

## 2024-10-28 ASSESSMENT — PAIN SCALES - GENERAL: PAINLEVEL_OUTOF10: 6

## 2024-10-28 ASSESSMENT — ENCOUNTER SYMPTOMS: DEPRESSION: 0

## 2024-10-29 ENCOUNTER — PATIENT OUTREACH (OUTPATIENT)
Dept: CARE COORDINATION | Facility: CLINIC | Age: 29
End: 2024-10-29
Payer: COMMERCIAL
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
## 2024-11-18 PROBLEM — F32.A ANXIETY AND DEPRESSION: Status: ACTIVE | Noted: 2020-05-27

## 2024-11-18 PROBLEM — M54.32 SCIATICA, LEFT SIDE: Status: ACTIVE | Noted: 2017-10-19

## 2024-11-18 PROBLEM — N92.0 MENORRHAGIA: Status: ACTIVE | Noted: 2024-11-18

## 2024-11-18 PROBLEM — F41.9 ANXIETY AND DEPRESSION: Status: ACTIVE | Noted: 2020-05-27

## 2024-11-18 PROBLEM — T14.8XXA MUSCLE STRAIN: Status: ACTIVE | Noted: 2024-09-20

## 2024-11-18 PROBLEM — S39.012A STRAIN OF BACK: Status: ACTIVE | Noted: 2024-11-18

## 2024-11-18 PROBLEM — S93.402A LEFT ANKLE SPRAIN: Status: ACTIVE | Noted: 2024-09-20

## 2024-11-26 ENCOUNTER — APPOINTMENT (OUTPATIENT)
Dept: CARE COORDINATION | Facility: CLINIC | Age: 29
End: 2024-11-26
Payer: COMMERCIAL

## 2024-12-05 ENCOUNTER — APPOINTMENT (OUTPATIENT)
Dept: PRIMARY CARE | Facility: CLINIC | Age: 29
End: 2024-12-05
Payer: COMMERCIAL

## 2024-12-06 ENCOUNTER — PATIENT OUTREACH (OUTPATIENT)
Dept: CARE COORDINATION | Facility: CLINIC | Age: 29
End: 2024-12-06
Payer: COMMERCIAL

## 2024-12-11 ENCOUNTER — PATIENT OUTREACH (OUTPATIENT)
Dept: CARE COORDINATION | Facility: CLINIC | Age: 29
End: 2024-12-11

## 2024-12-11 ENCOUNTER — APPOINTMENT (OUTPATIENT)
Dept: CARE COORDINATION | Facility: CLINIC | Age: 29
End: 2024-12-11
Payer: COMMERCIAL

## 2024-12-11 NOTE — PROGRESS NOTES
Attempted to call pt again to reschedule appt with this RD for 12/18 without success. LVM with this RD's info. Pt is encouraged to call this RD back at [phone]

## 2024-12-18 ENCOUNTER — APPOINTMENT (OUTPATIENT)
Dept: CARE COORDINATION | Facility: CLINIC | Age: 29
End: 2024-12-18
Payer: COMMERCIAL

## 2025-01-15 ENCOUNTER — APPOINTMENT (OUTPATIENT)
Dept: CARE COORDINATION | Facility: CLINIC | Age: 30
End: 2025-01-15
Payer: COMMERCIAL

## 2025-01-20 ENCOUNTER — PATIENT MESSAGE (OUTPATIENT)
Dept: CARE COORDINATION | Facility: CLINIC | Age: 30
End: 2025-01-20
Payer: COMMERCIAL

## 2025-01-20 ENCOUNTER — PATIENT OUTREACH (OUTPATIENT)
Dept: CARE COORDINATION | Facility: CLINIC | Age: 30
End: 2025-01-20
Payer: COMMERCIAL

## 2025-02-25 ENCOUNTER — TELEPHONE (OUTPATIENT)
Dept: OTHER | Age: 30
End: 2025-02-25

## 2025-03-10 ENCOUNTER — APPOINTMENT (OUTPATIENT)
Dept: BEHAVIORAL HEALTH | Facility: CLINIC | Age: 30
End: 2025-03-10

## 2025-03-17 DIAGNOSIS — F32.A DEPRESSION, UNSPECIFIED DEPRESSION TYPE: ICD-10-CM

## 2025-03-17 DIAGNOSIS — F41.9 ANXIETY: ICD-10-CM

## 2025-03-17 RX ORDER — BUPROPION HYDROCHLORIDE 100 MG/1
100 TABLET, EXTENDED RELEASE ORAL 2 TIMES DAILY
Qty: 180 TABLET | Refills: 0 | Status: SHIPPED | OUTPATIENT
Start: 2025-03-17

## 2025-06-17 DIAGNOSIS — F41.9 ANXIETY: ICD-10-CM

## 2025-06-17 DIAGNOSIS — F32.A DEPRESSION, UNSPECIFIED DEPRESSION TYPE: ICD-10-CM

## 2025-06-17 RX ORDER — BUPROPION HYDROCHLORIDE 100 MG/1
100 TABLET, EXTENDED RELEASE ORAL 2 TIMES DAILY
Qty: 180 TABLET | Refills: 0 | Status: SHIPPED | OUTPATIENT
Start: 2025-06-17

## 2025-07-17 ENCOUNTER — APPOINTMENT (OUTPATIENT)
Dept: PRIMARY CARE | Facility: CLINIC | Age: 30
End: 2025-07-17
Payer: COMMERCIAL

## 2025-07-17 VITALS
OXYGEN SATURATION: 97 % | DIASTOLIC BLOOD PRESSURE: 77 MMHG | TEMPERATURE: 97.3 F | BODY MASS INDEX: 47.72 KG/M2 | WEIGHT: 269.4 LBS | SYSTOLIC BLOOD PRESSURE: 115 MMHG | HEART RATE: 95 BPM

## 2025-07-17 DIAGNOSIS — M25.552 LEFT HIP PAIN: Primary | ICD-10-CM

## 2025-07-17 PROCEDURE — 99214 OFFICE O/P EST MOD 30 MIN: CPT | Performed by: NURSE PRACTITIONER

## 2025-07-17 ASSESSMENT — ENCOUNTER SYMPTOMS: DEPRESSION: 0

## 2025-07-17 ASSESSMENT — PAIN SCALES - GENERAL: PAINLEVEL_OUTOF10: 6
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
## 2025-07-18 ASSESSMENT — ENCOUNTER SYMPTOMS
LOSS OF MOTION: 0
NUMBNESS: 0
INABILITY TO BEAR WEIGHT: 0
TINGLING: 0
BACK PAIN: 1
HIP PAIN: 1

## 2025-08-06 ENCOUNTER — APPOINTMENT (OUTPATIENT)
Dept: ORTHOPEDIC SURGERY | Facility: CLINIC | Age: 30
End: 2025-08-06
Payer: COMMERCIAL

## 2025-08-20 ENCOUNTER — APPOINTMENT (OUTPATIENT)
Dept: ORTHOPEDIC SURGERY | Facility: CLINIC | Age: 30
End: 2025-08-20
Payer: COMMERCIAL

## 2025-10-08 ENCOUNTER — APPOINTMENT (OUTPATIENT)
Dept: ORTHOPEDIC SURGERY | Facility: CLINIC | Age: 30
End: 2025-10-08
Payer: COMMERCIAL

## 2025-11-06 ENCOUNTER — APPOINTMENT (OUTPATIENT)
Dept: PRIMARY CARE | Facility: CLINIC | Age: 30
End: 2025-11-06
Payer: COMMERCIAL